# Patient Record
(demographics unavailable — no encounter records)

---

## 2025-07-18 NOTE — DISCUSSION/SUMMARY
[Anticipatory Guidance Given] : Anticipatory guidance addressed as per the history of present illness section [ Transition] :  transition [ Care] :  care [Nutritional Adequacy] : nutritional adequacy [Parental Well-Being] : parental well-being [Safety] : safety [Hepatitis B In Hospital] : Hepatitis B administered while in the hospital [Mother] : mother [Parental Concerns Addressed] : Parental concerns addressed [FreeTextEntry1] : Recommend exclusive breastfeeding, 8-12 feedings per day. Mother should continue prenatal vitamins and avoid alcohol. If formula is needed, recommend iron-fortified formulations every 2-3 hrs. When in car, patient should be in rear-facing car seat in back seat. Air dry umbillical stump. Put baby to sleep on back, in own crib with no loose or soft bedding. Limit baby's exposure to others, especially those with fever or unknown vaccine status.

## 2025-07-18 NOTE — PHYSICAL EXAM
[Alert] : alert [Normocephalic] : normocephalic [Flat Open Anterior [place]] : flat open anterior fontanelle [PERRL] : PERRL [Red Reflex Bilateral] : red reflex bilateral [Normally Placed Ears] : normally placed ears [Auricles Well Formed] : auricles well formed [Clear Tympanic membranes] : clear tympanic membranes [Light reflex present] : light reflex present [Bony structures visible] : bony structures visible [Patent Auditory Canal] : patent auditory canal [Nares Patent] : nares patent [Palate Intact] : palate intact [Uvula Midline] : uvula midline [Supple, full passive range of motion] : supple, full passive range of motion [Symmetric Chest Rise] : symmetric chest rise [Clear to Auscultation Bilaterally] : clear to auscultation bilaterally [Regular Rate and Rhythm] : regular rate and rhythm [S1, S2 present] : S1, S2 present [+2 Femoral Pulses] : +2 femoral pulses [Soft] : soft [Bowel Sounds] : bowel sounds present [Umbilical Stump Dry, Clean, Intact] : umbilical stump dry, clean, intact [Normal external genitailia] : normal external genitalia [Central Urethral Opening] : central urethral opening [Testicles Descended Bilaterally] : testicles descended bilaterally [Patent] : patent [Normally Placed] : normally placed [No Abnormal Lymph Nodes Palpated] : no abnormal lymph nodes palpated [Symmetric Flexed Extremities] : symmetric flexed extremities [Startle Reflex] : startle reflex present [Suck Reflex] : suck reflex present [Rooting] : rooting reflex present [Palmar Grasp] : palmar grasp present [Plantar Grasp] : plantar reflex present [Symmetric Isabel] : symmetric [place] [Acute Distress] : no acute distress [Icteric sclera] : nonicteric sclera [Discharge] : no discharge [Palpable Masses] : no palpable masses [Murmurs] : no murmurs [Tender] : nontender [Distended] : not distended [Hepatomegaly] : no hepatomegaly [Splenomegaly] : no splenomegaly [Hollins-Ortolani] : negative Hollins-Ortolani [Spinal Dimple] : no spinal dimple [Tuft of Hair] : no tuft of hair [Jaundice] : not jaundice

## 2025-07-18 NOTE — HISTORY OF PRESENT ILLNESS
[Born at ___ Wks Gestation] : The patient was born at [unfilled] weeks gestation [C/S] : via  section [BW: _____] : weight of [unfilled] [Length: _____] : length of [unfilled] [HC: _____] : head circumference of [unfilled] [DW: _____] : Discharge weight was [unfilled] [Time of Birth: _____] : Time of birth was [unfilled] [Breast milk] : breast milk [Formula ___ oz/feed] : [unfilled] oz of formula per feed [Hours between feeds ___] : Child is fed every [unfilled] hours [Normal] : Normal [Frequency of stools: ___] : Frequency of stools: [unfilled]  stools [Yellow] : yellow [Seedy] : seedy [In Bassinet/Crib] : sleeps in bassinet/crib [Pacifier] : Uses pacifier [No] : No cigarette smoke exposure [Water heater temperature set at <120 degrees F] : Water heater temperature set at <120 degrees F [Rear facing car seat in back seat] : Rear facing car seat in back seat [Carbon Monoxide Detectors] : Carbon monoxide detectors at home [Smoke Detectors] : Smoke detectors at home. [Hepatitis B Vaccine Given] : Hepatitis B vaccine given [Other: _____] : at [unfilled] [Age: ___] : [unfilled] year old mother [Rubella (Immune)] : Rubella immune [None] : There are no risk factors [(1) _____] : [unfilled] [(5) _____] : [unfilled] [RSV vaccine] : RSV vaccine not received by mother at least 14 days prior to delivery [HepBsAG] : HepBsAg negative [HIV] : HIV negative [HepC] : Hepatitis C negative [GBS] : GBS negative [VDRL/RPR (Reactive)] : VDRL/RPR nonreactive [] : negative [FreeTextEntry9] : O+ [Exposure to electronic nicotine delivery system] : No exposure to electronic nicotine delivery system

## 2025-07-18 NOTE — PHYSICAL EXAM
[Alert] : alert [Normocephalic] : normocephalic [Flat Open Anterior [place]] : flat open anterior fontanelle [PERRL] : PERRL [Red Reflex Bilateral] : red reflex bilateral [Normally Placed Ears] : normally placed ears [Auricles Well Formed] : auricles well formed [Clear Tympanic membranes] : clear tympanic membranes [Light reflex present] : light reflex present [Bony structures visible] : bony structures visible [Patent Auditory Canal] : patent auditory canal [Nares Patent] : nares patent [Palate Intact] : palate intact [Uvula Midline] : uvula midline [Supple, full passive range of motion] : supple, full passive range of motion [Symmetric Chest Rise] : symmetric chest rise [Clear to Auscultation Bilaterally] : clear to auscultation bilaterally [Regular Rate and Rhythm] : regular rate and rhythm [S1, S2 present] : S1, S2 present [+2 Femoral Pulses] : +2 femoral pulses [Soft] : soft [Bowel Sounds] : bowel sounds present [Umbilical Stump Dry, Clean, Intact] : umbilical stump dry, clean, intact [Normal external genitailia] : normal external genitalia [Central Urethral Opening] : central urethral opening [Testicles Descended Bilaterally] : testicles descended bilaterally [Patent] : patent [Normally Placed] : normally placed [No Abnormal Lymph Nodes Palpated] : no abnormal lymph nodes palpated [Symmetric Flexed Extremities] : symmetric flexed extremities [Startle Reflex] : startle reflex present [Suck Reflex] : suck reflex present [Rooting] : rooting reflex present [Palmar Grasp] : palmar grasp present [Plantar Grasp] : plantar reflex present [Symmetric Isabel] : symmetric [place] [Acute Distress] : no acute distress [Icteric sclera] : nonicteric sclera [Discharge] : no discharge [Palpable Masses] : no palpable masses [Murmurs] : no murmurs [Tender] : nontender [Distended] : not distended [Hepatomegaly] : no hepatomegaly [Splenomegaly] : no splenomegaly [Hollins-Ortolani] : negative Hollins-Ortolani [Spinal Dimple] : no spinal dimple [Tuft of Hair] : no tuft of hair [Jaundice] : not jaundice

## 2025-07-22 NOTE — HISTORY OF PRESENT ILLNESS
[Breast milk] : breast milk [Formula ___ oz/feed] : [unfilled] oz of formula per feed [Normal] : Normal [___ voids per day] : [unfilled] voids per day [Frequency of stools: ___] : Frequency of stools: [unfilled]  stools [per day] : per day. [Yellow] : yellow [Hours between feeds ___] : Child is fed every [unfilled] hours [Seedy] : seedy [In Bassinet/Crib] : sleeps in bassinet/crib [Pacifier] : Uses pacifier [No] : No cigarette smoke exposure [Water heater temperature set at <120 degrees F] : Water heater temperature set at <120 degrees F [Rear facing car seat in back seat] : Rear facing car seat in back seat [Carbon Monoxide Detectors] : Carbon monoxide detectors at home [Smoke Detectors] : Smoke detectors at home. [Hepatitis B Vaccine Given] : Hepatitis B vaccine given [Exposure to electronic nicotine delivery system] : No exposure to electronic nicotine delivery system [FreeTextEntry1] : Jose Alejandro presents with his mother for a follow-up visit. The mother reports that Jose Alejandro has been spitting up frequently after feedings. He is currently being  and also receiving approximately 5 ounces of formula per feeding. The mother expresses concern about the amount of spit-up and inquires about potentially reducing milk intake. She mentions that her other children had milk issues in the past. The umbilical cord stump fell off recently, and the mother noticed some discharge from the site. The cord detachment occurred on Saturday, and there is a noticeable odor from the area. The mother has been hesitant to fully bathe the baby due to the umbilical cord situation.

## 2025-07-22 NOTE — PHYSICAL EXAM
[Alert] : alert [Normocephalic] : normocephalic [Flat Open Anterior [place]] : flat open anterior fontanelle [PERRL] : PERRL [Red Reflex Bilateral] : red reflex bilateral [Normally Placed Ears] : normally placed ears [Auricles Well Formed] : auricles well formed [Clear Tympanic membranes] : clear tympanic membranes [Light reflex present] : light reflex present [Bony landmarks visible] : bony landmarks visible [Nares Patent] : nares patent [Palate Intact] : palate intact [Uvula Midline] : uvula midline [Supple, full passive range of motion] : supple, full passive range of motion [Symmetric Chest Rise] : symmetric chest rise [Clear to Auscultation Bilaterally] : clear to auscultation bilaterally [Regular Rate and Rhythm] : regular rate and rhythm [S1, S2 present] : S1, S2 present [+2 Femoral Pulses] : +2 femoral pulses [Soft] : soft [Bowel Sounds] : bowel sounds present [Umbilical Granuloma] : umbilical granuloma present [Normal external genitailia] : normal external genitalia [Central Urethral Opening] : central urethral opening [Testicles Descended Bilaterally] : testicles descended bilaterally [Patent] : patent [Normally Placed] : normally placed [No Abnormal Lymph Nodes Palpated] : no abnormal lymph nodes palpated [Symmetric Flexed Extremities] : symmetric flexed extremities [Straight] : straight [Startle Reflex] : startle reflex present [Suck Reflex] : suck reflex present [Rooting] : rooting reflex present [Palmar Grasp] : palmar grasp reflex present [Plantar Grasp] : plantar grasp reflex present [Symmetric Isabel] : symmetric [place] [Acute Distress] : no acute distress [Icteric sclera] : nonicteric sclera [Discharge] : no discharge [Palpable Masses] : no palpable masses [Murmurs] : no murmurs [Tender] : nontender [Distended] : not distended [Hepatomegaly] : no hepatomegaly [Splenomegaly] : no splenomegaly [Hollins-Ortolani] : negative Hollins-Ortolani [Spinal Dimple] : no spinal dimple [Tuft of Hair] : no tuft of hair [Jaundice] : not jaundice

## 2025-07-22 NOTE — PHYSICAL EXAM
[Alert] : alert [Normocephalic] : normocephalic [Flat Open Anterior [place]] : flat open anterior fontanelle [PERRL] : PERRL [Red Reflex Bilateral] : red reflex bilateral [Normally Placed Ears] : normally placed ears [Auricles Well Formed] : auricles well formed [Clear Tympanic membranes] : clear tympanic membranes [Light reflex present] : light reflex present [Bony landmarks visible] : bony landmarks visible [Nares Patent] : nares patent [Palate Intact] : palate intact [Uvula Midline] : uvula midline [Supple, full passive range of motion] : supple, full passive range of motion [Symmetric Chest Rise] : symmetric chest rise [Clear to Auscultation Bilaterally] : clear to auscultation bilaterally [Regular Rate and Rhythm] : regular rate and rhythm [S1, S2 present] : S1, S2 present [+2 Femoral Pulses] : +2 femoral pulses [Soft] : soft [Bowel Sounds] : bowel sounds present [Umbilical Granuloma] : umbilical granuloma present [Normal external genitailia] : normal external genitalia [Central Urethral Opening] : central urethral opening [Testicles Descended Bilaterally] : testicles descended bilaterally [Patent] : patent [Normally Placed] : normally placed [No Abnormal Lymph Nodes Palpated] : no abnormal lymph nodes palpated [Symmetric Flexed Extremities] : symmetric flexed extremities [Straight] : straight [Startle Reflex] : startle reflex present [Suck Reflex] : suck reflex present [Rooting] : rooting reflex present [Palmar Grasp] : palmar grasp reflex present [Plantar Grasp] : plantar grasp reflex present [Symmetric Isabel] : symmetric [place] [Acute Distress] : no acute distress [Icteric sclera] : nonicteric sclera [Discharge] : no discharge [Palpable Masses] : no palpable masses [Murmurs] : no murmurs [Tender] : nontender [Distended] : not distended [Hepatomegaly] : no hepatomegaly [Splenomegaly] : no splenomegaly [Hollins-Ortolani] : negative Hollins-Ortolani [Spinal Dimple] : no spinal dimple [Tuft of Hair] : no tuft of hair [Jaundice] : not jaundice

## 2025-07-22 NOTE — DISCUSSION/SUMMARY
[Anticipatory Guidance Given] : Anticipatory guidance addressed as per the history of present illness section [ Transition] :  transition [ Care] :  care [Nutritional Adequacy] : nutritional adequacy [Parental Well-Being] : parental well-being [Safety] : safety [Hepatitis B In Hospital] : Hepatitis B administered while in the hospital [No Medications] : ~He/She~ is not on any medications [Mother] : mother [Parental Concerns Addressed] : Parental concerns addressed [FreeTextEntry1] : Assessment and Plan:   - **Umbilical Granuloma:** Jose Alejandro has developed an umbilical granuloma at the site of the recently detached umbilical cord. This is evidenced by the presence of discharge, odor, and visible granulation tissue.     - Cauterization of the umbilical granuloma performed during the visit      - Instruct parents to avoid bathing the baby for the next five days to allow proper healing      - Educate parents on proper care of the umbilical area      - Follow-up in 2-3 weeks for his 1 month well check up   - **Excessive Spitting Up:** Jose Alejandro is experiencing frequent spitting up after feedings, which is likely due to normal physiological gastroesophageal reflux in infants.     - Reassure parents that some degree of spitting up is normal in infants      - Recommend keeping the baby upright for 15-20 minutes after feeding      - Suggest slightly elevating the head of the mattress during daytime naps      - Advise maintaining current feeding volume (5 ounces) unless excessive crying occurs      - Instruct parents to monitor for signs of milk protein intolerance, such as fussiness or mucus in stool      - Discuss the possibility of switching to specialized formula (e.g., Neocate or Elecare) if intolerance symptoms develop    - **Routine [place] Care:** Jose Alejadnro is here for routine  follow-up and is generally doing well aside from the umbilical granuloma and spitting up concerns.     - Confirm that Jose Alejandro received the first dose of Hepatitis B vaccine at the hospital      - Schedule next visit in 2-3 weeks for the second Hepatitis B vaccine      - Provide anticipatory guidance on normal  development and care

## 2025-07-22 NOTE — DISCUSSION/SUMMARY
[Anticipatory Guidance Given] : Anticipatory guidance addressed as per the history of present illness section [ Transition] :  transition [ Care] :  care [Nutritional Adequacy] : nutritional adequacy [Parental Well-Being] : parental well-being [Safety] : safety [Hepatitis B In Hospital] : Hepatitis B administered while in the hospital [No Medications] : ~He/She~ is not on any medications [Mother] : mother [Parental Concerns Addressed] : Parental concerns addressed [FreeTextEntry1] : Assessment and Plan:   - **Umbilical Granuloma:** Jose Alejandro has developed an umbilical granuloma at the site of the recently detached umbilical cord. This is evidenced by the presence of discharge, odor, and visible granulation tissue.     - Cauterization of the umbilical granuloma performed during the visit      - Instruct parents to avoid bathing the baby for the next five days to allow proper healing      - Educate parents on proper care of the umbilical area      - Follow-up in 2-3 weeks for his 1 month well check up   - **Excessive Spitting Up:** Jose Alejandro is experiencing frequent spitting up after feedings, which is likely due to normal physiological gastroesophageal reflux in infants.     - Reassure parents that some degree of spitting up is normal in infants      - Recommend keeping the baby upright for 15-20 minutes after feeding      - Suggest slightly elevating the head of the mattress during daytime naps      - Advise maintaining current feeding volume (5 ounces) unless excessive crying occurs      - Instruct parents to monitor for signs of milk protein intolerance, such as fussiness or mucus in stool      - Discuss the possibility of switching to specialized formula (e.g., Neocate or Elecare) if intolerance symptoms develop    - **Routine [place] Care:** Jose Alejandro is here for routine  follow-up and is generally doing well aside from the umbilical granuloma and spitting up concerns.     - Confirm that Jose Alejandro received the first dose of Hepatitis B vaccine at the hospital      - Schedule next visit in 2-3 weeks for the second Hepatitis B vaccine      - Provide anticipatory guidance on normal  development and care